# Patient Record
Sex: MALE | Race: WHITE
[De-identification: names, ages, dates, MRNs, and addresses within clinical notes are randomized per-mention and may not be internally consistent; named-entity substitution may affect disease eponyms.]

---

## 2017-06-01 ENCOUNTER — HOSPITAL ENCOUNTER (EMERGENCY)
Dept: HOSPITAL 26 - MED | Age: 37
LOS: 1 days | Discharge: HOME | End: 2017-06-02
Payer: COMMERCIAL

## 2017-06-01 VITALS — BODY MASS INDEX: 37.22 KG/M2 | WEIGHT: 290 LBS | HEIGHT: 74 IN

## 2017-06-01 DIAGNOSIS — J20.9: Primary | ICD-10-CM

## 2017-06-01 DIAGNOSIS — I10: ICD-10-CM

## 2017-06-01 PROCEDURE — 99283 EMERGENCY DEPT VISIT LOW MDM: CPT

## 2017-06-01 PROCEDURE — 71010: CPT

## 2017-06-02 VITALS — SYSTOLIC BLOOD PRESSURE: 128 MMHG | DIASTOLIC BLOOD PRESSURE: 78 MMHG

## 2017-06-02 VITALS — DIASTOLIC BLOOD PRESSURE: 78 MMHG | SYSTOLIC BLOOD PRESSURE: 128 MMHG

## 2017-06-02 NOTE — NUR
PATIENT PRESENTS TO ED WITH COUGH, SORE THROAT X6 DAYS

TOOK CHLORASEPTIC SPRAY AT HOME

HX OF HYPERTENSION

PT DENIES N/V/D; SKIN IS PINK/WARM/DRY; AAOX4 WITH EVEN AND STEADY GAIT; LUNGS 
CLEAR BL; HR EVEN AND REGULAR; PT DENIES ANY FEVER, CP OR SOB AT THIS TIME; 
PATIENT STATES PAIN OF 7/10 AT THIS TIME; VSS; PATIENT POSITIONED FOR COMFORT; 
HOB ELEVATED; BEDRAILS UP X2; BED DOWN. ER MD MADE AWARE OF PT STATUS.

## 2017-06-13 ENCOUNTER — HOSPITAL ENCOUNTER (EMERGENCY)
Dept: HOSPITAL 26 - MED | Age: 37
Discharge: HOME | End: 2017-06-13
Payer: COMMERCIAL

## 2017-06-13 VITALS — WEIGHT: 300 LBS | BODY MASS INDEX: 38.5 KG/M2 | HEIGHT: 74 IN

## 2017-06-13 VITALS — DIASTOLIC BLOOD PRESSURE: 80 MMHG | SYSTOLIC BLOOD PRESSURE: 136 MMHG

## 2017-06-13 DIAGNOSIS — I10: ICD-10-CM

## 2017-06-13 DIAGNOSIS — J20.9: Primary | ICD-10-CM

## 2017-06-13 PROCEDURE — 99283 EMERGENCY DEPT VISIT LOW MDM: CPT

## 2017-06-13 PROCEDURE — 71010: CPT

## 2017-06-13 NOTE — NUR
PATIENT PRESENTS TO ED WITH COUGH. PT DENIES N/V/D; SKIN IS PINK/WARM/DRY; 
AAOX4 WITH EVEN AND STEADY GAIT; LUNGS CLEAR BL; HR EVEN AND REGULAR; PT DENIES 
ANY FEVER, CP OR SOB AT THIS TIME; PATIENT STATES PAIN OF 6/10 AT THIS TIME; 
VSS; PATIENT POSITIONED FOR COMFORT; HOB ELEVATED; BEDRAILS UP X2; BED DOWN. ER 
MD MADE AWARE OF PT STATUS.

## 2018-01-02 ENCOUNTER — HOSPITAL ENCOUNTER (EMERGENCY)
Dept: HOSPITAL 26 - MED | Age: 38
Discharge: HOME | End: 2018-01-02
Payer: COMMERCIAL

## 2018-01-02 VITALS — SYSTOLIC BLOOD PRESSURE: 150 MMHG | DIASTOLIC BLOOD PRESSURE: 95 MMHG

## 2018-01-02 VITALS — WEIGHT: 290 LBS | HEIGHT: 74 IN | BODY MASS INDEX: 37.22 KG/M2

## 2018-01-02 VITALS — DIASTOLIC BLOOD PRESSURE: 83 MMHG | SYSTOLIC BLOOD PRESSURE: 143 MMHG

## 2018-01-02 DIAGNOSIS — I10: ICD-10-CM
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
## 2018-01-02 DIAGNOSIS — Z79.899: ICD-10-CM
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
## 2018-01-02 DIAGNOSIS — T23.001A: Primary | ICD-10-CM
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
## 2018-01-07 ENCOUNTER — HOSPITAL ENCOUNTER (EMERGENCY)
Dept: HOSPITAL 26 - MED | Age: 38
LOS: 1 days | Discharge: LEFT BEFORE DISCHARGE | End: 2018-01-08
Payer: COMMERCIAL

## 2018-01-07 VITALS — DIASTOLIC BLOOD PRESSURE: 100 MMHG | SYSTOLIC BLOOD PRESSURE: 150 MMHG

## 2018-01-07 VITALS — WEIGHT: 290 LBS | HEIGHT: 74 IN | BODY MASS INDEX: 37.22 KG/M2

## 2018-01-07 DIAGNOSIS — R05: ICD-10-CM

## 2018-01-07 DIAGNOSIS — Z53.21: ICD-10-CM

## 2018-01-07 DIAGNOSIS — R50.9: Primary | ICD-10-CM
